# Patient Record
Sex: FEMALE
[De-identification: names, ages, dates, MRNs, and addresses within clinical notes are randomized per-mention and may not be internally consistent; named-entity substitution may affect disease eponyms.]

---

## 2018-06-20 ENCOUNTER — HOSPITAL ENCOUNTER (EMERGENCY)
Dept: HOSPITAL 5 - ED | Age: 68
LOS: 1 days | Discharge: HOME | End: 2018-06-21
Payer: MEDICARE

## 2018-06-20 DIAGNOSIS — Z90.49: ICD-10-CM

## 2018-06-20 DIAGNOSIS — S80.02XA: Primary | ICD-10-CM

## 2018-06-20 DIAGNOSIS — J45.909: ICD-10-CM

## 2018-06-20 DIAGNOSIS — I10: ICD-10-CM

## 2018-06-20 DIAGNOSIS — Z91.018: ICD-10-CM

## 2018-06-20 DIAGNOSIS — Z79.82: ICD-10-CM

## 2018-06-20 DIAGNOSIS — E11.9: ICD-10-CM

## 2018-06-20 DIAGNOSIS — Z79.4: ICD-10-CM
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
## 2018-06-20 DIAGNOSIS — Z88.2: ICD-10-CM

## 2018-06-20 PROCEDURE — 72170 X-RAY EXAM OF PELVIS: CPT

## 2018-06-20 PROCEDURE — 90715 TDAP VACCINE 7 YRS/> IM: CPT

## 2018-06-20 PROCEDURE — 70486 CT MAXILLOFACIAL W/O DYE: CPT

## 2018-06-20 PROCEDURE — 90471 IMMUNIZATION ADMIN: CPT

## 2018-06-20 PROCEDURE — 70450 CT HEAD/BRAIN W/O DYE: CPT

## 2018-06-20 PROCEDURE — A6250 SKIN SEAL PROTECT MOISTURIZR: HCPCS

## 2018-06-20 NOTE — XRAY REPORT
FINAL REPORT



PROCEDURE:  XR PELVIS 1-2V



TECHNIQUE:  Pelvis radiograph, AP view. CPT 35432







HISTORY:  fall leg pain 



COMPARISON:  No prior studies are available for comparison.



FINDINGS:  

Fracture(s): None .



Joint spaces: Normal .



Soft tissues: Normal .



Foreign bodies: None .



Bone mineralization: Normal .



A subcutaneous abdominal port with catheter is identified.



IMPRESSION:  

Unremarkable pelvis.

## 2018-06-20 NOTE — CAT SCAN REPORT
FINAL REPORT



PROCEDURE:  CT HEAD/BRAIN WO CON



TECHNIQUE:  Computerized tomography of the head was performed

without contrast material. 



HISTORY:  fall facial traum,a 



COMPARISON:  No prior studies are available for comparison.



FINDINGS:  

Skull and scalp: Normal.



Paranasal sinuses: Normal.



Ventricles and subarachnoid spaces: Are prominent consistent with

cerebral atrophy appropriate for patient's age..



Cerebrum: There is mild degree bilateral periventricular

nonspecific white matter hypodensity most likely representing

chronic microangiopathy. An old lacunar infarct is noted

involving the anterior limb right internal capsule. An acute

extra-axial or intra-axial hemorrhage is not identified. There is

no mass effect..



Cerebellum and brainstem: No evidence of hemorrhage, acute

infarction or mass.



Vasculature: Normal.



Comments: None.



IMPRESSION:  

No acute intracranial abnormality.

## 2018-06-20 NOTE — CAT SCAN REPORT
FINAL REPORT



PROCEDURE:  CT FACIAL BONES WO CON



TECHNIQUE:  Computerized tomography of the facial bones and soft

tissues with axial and coronal sections performed from the

cranial aspect of the frontal sinuses to the caudal portion of

the mandible without contrast material. 



HISTORY:  fall facial traum,a 



COMPARISON:  No prior studies are available for comparison.



FINDINGS:  

Bones: No significant abnormality.



Paranasal sinuses: Clear.



Soft tissues: No significant abnormality.



Other: Atherosclerotic calcification is noted involving bilateral

carotid bifurcations..



IMPRESSION:  

No acute fracture



Atherosclerotic calcification of bilateral carotid bifurcations.

Doppler evaluation is recommended.

## 2018-06-20 NOTE — EMERGENCY DEPARTMENT REPORT
ED Fall HPI





- General


Chief Complaint: Fall


Stated Complaint: KNEE PAIN


Time Seen by Provider: 06/20/18 21:19


Source: patient, RN notes reviewed


Mode of arrival: Stretcher


Limitations: Physical Limitation





- History of Present Illness


Initial Comments: 











Primary care Dr.: Aruna phan





Orthopedics: Adeline Francis is a 68-year-old female, unknown to this provider, takes aspirin, presents 

to the ER after mechanical trip and fall, landing on her left knee, and left 

side of her face.  There were no symptoms prior to the fall, she tripped while 

walking her dog.  Her left knee pain is sharp and achy, increases with palpation

, decreases with rest, does not radiate anywhere.


She can't recall her last tetanus vaccination, and has left-sided forehead pain

, and left cheek pain where she fell.  There is no midline neck pain, there is 

no weakness, numbness, ataxia, chest pain, shortness of breath, abdominal pain, 

and there is no bladder or bowel retention or incontinence.











MD Complaint: fall


-: Sudden


Fall From: standing


When Fall Occurred: 1 hour PTA


Fall Witnessed: no


Place Fall Occurred: home


Loss of Consciousness: none


Prolonged Down Time?: no


Symptoms Prior to Fall: none


Location: head, other


Location - Extremities: Left: Knee, Leg


Severity: moderate


Quality: other (see history of present illness)


Associated Symptoms: denies: neck pain, numbness, weakness, chest paint, 

shortness of breath, abdominal pain, hematuria, lightheaded, vertigo, confusion





- Related Data


 Home Medications











 Medication  Instructions  Recorded  Confirmed  Last Taken


 


Aspirin [Aspirin BABY CHEW TAB] 81 mg PO QDAY 11/26/13 08/27/14 08/13/14





    81 MG


 


Atorvastatin (Nf) [Lipitor (Nf)] 20 mg PO QDAY 11/26/13 08/27/14 08/26/14 22:00





    20 MG


 


Celecoxib [celeBREX] 200 mg PO DAILY 11/26/13 08/27/14 08/26/14 22:00





    200 MG


 


Insulin Detemir [Levemir Flexpen] 100 unit SQ HS 11/26/13 08/27/14 08/25/14 23:

30





    54 UNITS


 


Valsartan [Diovan] 160 mg PO QDAY 11/26/13 08/27/14 08/27/14 06:00





    160 MG


 


Allopurinol [Zyloprim] 300 mg PO QDAY 08/05/14 08/27/14 08/26/14 08:30





    300 MG


 


Escitalopram [Lexapro] 10 mg PO DAILY 08/05/14 08/27/14 08/26/14 20:00





    10 MG


 


Glipizide/Metformin HCl 1 each PO BID 08/05/14 08/27/14 08/26/14 22:00





[glipiZIDE-Metformin 5-500 mg]    1 TAB


 


Sitagliptin Phosphate [Januvia] 100 mg PO DAILY 08/05/14 08/27/14 08/26/14 09:00





    100 mg


 


Levalbuterol HCl [Xopenex] 3 ml INHALATION PRN PRN 08/27/14 08/27/14 08/27/14 06

:00





    3 ML








 Previous Rx's











 Medication  Instructions  Recorded  Last Taken  Type


 


HYDROcodone/ACETAMINOPHEN [Norco 1 each PO 6XD PRN #60 tablet 09/03/14 Unknown 

Rx





5/325 Tablet]    


 


Acetaminophen [Tylenol Arthritis] 650 mg PO Q6HR PRN #30 tablet.er 06/20/18 

Unknown Rx


 


Bacitracin Zinc Oint [Antibiotic 20 applic TP BID #1 tube 06/20/18 Unknown Rx





Oint]    











 Allergies











Allergy/AdvReac Type Severity Reaction Status Date / Time


 


Sulfa (Sulfonamide Allergy  Swelling Verified 12/12/13 07:56





Antibiotics)     


 


corn [Corn] AdvReac  Unknown Verified 08/05/14 12:45


 


strawberry [Strawberry] AdvReac  Unknown Verified 08/05/14 12:45














ED Review of Systems


ROS: 


Stated complaint: KNEE PAIN


Other details as noted in HPI





Comment: All other systems reviewed and negative





ED Past Medical Hx





- Past Medical History


Hx Hypertension: Yes (2001/PCP-domo [facility])


Hx Heart Attack/AMI: No


Hx Diabetes: Yes (1994)


Hx Renal Disease: No


Hx Headaches / Migraines: No


Hx Seizures: No


Hx Asthma: Yes


Hx Dementia: No





- Surgical History


Hx Cholecystectomy: Yes (1994)


Hx Appendectomy: Yes (1965)


Additional Surgical History: R KNEE





- Social History


Smoking Status: Never Smoker


Substance Use Type: None





- Medications


Home Medications: 


 Home Medications











 Medication  Instructions  Recorded  Confirmed  Last Taken  Type


 


Aspirin [Aspirin BABY CHEW TAB] 81 mg PO QDAY 11/26/13 08/27/14 08/13/14 History





    81 MG 


 


Atorvastatin (Nf) [Lipitor (Nf)] 20 mg PO QDAY 11/26/13 08/27/14 08/26/14 22:00 

History





    20 MG 


 


Celecoxib [celeBREX] 200 mg PO DAILY 11/26/13 08/27/14 08/26/14 22:00 History





    200 MG 


 


Insulin Detemir [Levemir Flexpen] 100 unit SQ HS 11/26/13 08/27/14 08/25/14 23:

30 History





    54 UNITS 


 


Valsartan [Diovan] 160 mg PO QDAY 11/26/13 08/27/14 08/27/14 06:00 History





    160 MG 


 


Allopurinol [Zyloprim] 300 mg PO QDAY 08/05/14 08/27/14 08/26/14 08:30 History





    300 MG 


 


Escitalopram [Lexapro] 10 mg PO DAILY 08/05/14 08/27/14 08/26/14 20:00 History





    10 MG 


 


Glipizide/Metformin HCl 1 each PO BID 08/05/14 08/27/14 08/26/14 22:00 History





[glipiZIDE-Metformin 5-500 mg]    1 TAB 


 


Sitagliptin Phosphate [Januvia] 100 mg PO DAILY 08/05/14 08/27/14 08/26/14 09:

00 History





    100 mg 


 


Levalbuterol HCl [Xopenex] 3 ml INHALATION PRN PRN 08/27/14 08/27/14 08/27/14 06

:00 History





    3 ML 


 


HYDROcodone/ACETAMINOPHEN [Norco 1 each PO 6XD PRN #60 tablet 09/03/14  Unknown 

Rx





5/325 Tablet]     


 


Acetaminophen [Tylenol Arthritis] 650 mg PO Q6HR PRN #30 tablet.er 06/20/18  

Unknown Rx


 


Bacitracin Zinc Oint [Antibiotic 20 applic TP BID #1 tube 06/20/18  Unknown Rx





Oint]     














ED Physical Exam





- General


Limitations: Physical Limitation


General appearance: alert, in no apparent distress





- Head


Head exam: Present: normocephalic, other (superficial forehead abrasions noted)





- Eye


Eye exam: Present: normal appearance, EOMI, other (visual acuity intact to 

finger counting, color perception, reading at a close distance).  Absent: 

nystagmus





- ENT


ENT exam: Present: normal exam, normal orophraynx, mucous membranes moist, TM's 

normal bilaterally (negative nasal septal hematoma.  No hemotympanum.), normal 

external ear exam, other (no dental laxity)





- Neck


Neck exam: Present: normal inspection, full ROM.  Absent: tenderness, 

meningismus





- Respiratory


Respiratory exam: Present: normal lung sounds bilaterally.  Absent: respiratory 

distress





- Cardiovascular


Cardiovascular Exam: Present: regular rate, normal rhythm, normal heart sounds.

  Absent: bradycardia, tachycardia, irregular rhythm, systolic murmur, 

diastolic murmur, rubs, gallop





- GI/Abdominal


GI/Abdominal exam: Present: soft, normal bowel sounds.  Absent: distended, 

tenderness, guarding, rebound, rigid, pulsatile mass





- Extremities Exam


Extremities exam: Present: full ROM, tenderness, normal capillary refill, other 

(2+ pulses noted in the bilateral upper, lower extremities.  The pelvis is 

stable.  There is no long bony tenderness.  Left knee is tender and swollen.  

There is no laxity.  There is full active, passive range of motion of the 

bilateral upper extremities, right lower extremity.  There is no distal lower 

extremity tenderness on the left, with the exception of the left knee 

tenderness.).  Absent: pedal edema, joint swelling, calf tenderness





- Back Exam


Back exam: Present: normal inspection, full ROM.  Absent: paraspinal tenderness

, vertebral tenderness





- Neurological Exam


Neurological exam: Present: alert, oriented X3, CN II-XII intact, other (

Extraocular movements intact.  Tongue midline.  No facial droop.  Facial 

sensation intact to light touch in the V1, V2, V3 distribution bilaterally.  5 

and 5 strength in 4 extremities..  Sensation is intact to light touch in 4 

extremities.).  Absent: motor sensory deficit





- Psychiatric


Psychiatric exam: Present: normal affect, normal mood





- Skin


Skin exam: Present: warm, abrasion, ecchymosis





ED Course


 Vital Signs











  06/20/18 06/20/18 06/20/18





  21:12 21:16 21:21


 


Temperature   98 F


 


Pulse Rate  86 91 H


 


Respiratory  25 H 18





Rate   


 


Blood Pressure  142/68 142/68


 


O2 Sat by Pulse 97 95 97





Oximetry   














ED Medical Decision Making





- Lab Data








 Vital Signs











  06/20/18 06/20/18 06/20/18





  21:12 21:16 21:21


 


Temperature   98 F


 


Pulse Rate  86 91 H


 


Respiratory  25 H 18





Rate   


 


Blood Pressure  142/68 142/68


 


O2 Sat by Pulse 97 95 97





Oximetry   














  06/20/18





  22:50


 


Temperature 


 


Pulse Rate 


 


Respiratory 16





Rate 


 


Blood Pressure 


 


O2 Sat by Pulse 





Oximetry 














- Radiology Data


Radiology results: report reviewed, image reviewed


interpreted by me: 





X-ray of the pelvis/left knee/left femur is negative for acute disease





Soft tissue swelling is noted, no obvious fracture or dislocation











Noncontrast CT scan of the brain, facial bones negative for acute disease





- Medical Decision Making





Differential diagnosis, including but not limited to: Abrasion, fracture, 

facial injury, soft tissue injury











Assessment and plan: 68-year-old female status post mechanical trip and fall.

Patient is clinically sober at this time.  The cervical spine is cleared 

through nexus and [place] c spine rule





Noncontrast CT scan of the brain and facial bones were negative.  X-ray of the 

pelvis, left knee, left femur were negative.  Her physical exam is otherwise 

unremarkable.  The patient is clinically sober with a GCS of 15, with an NIH 

score of 0.  She is given a tetanus vaccination and pain medication.  Expectant 

management and return precautions were reviewed with the patient and her 

family.  Family feels okay to take the patient home and observe her.  The 

patient also feels okay with this plan.





Critical care attestation.: 


If time is entered above; I have spent that time in minutes in the direct care 

of this critically ill patient, excluding procedure time.








ED Disposition


Clinical Impression: 


 Left knee pain, Facial contusion, Fall





Disposition: DC-01 TO HOME OR SELFCARE


Is pt being admited?: No


Does the pt Need Aspirin: No


Condition: Stable


Instructions:  Arthralgia (ED)


Additional Instructions: 


Pain typically gets worse before it gets better after a mechanical fall.  Rest, 

and avoid heavy lifting.  Avoid strenuous physical activity.  X-ray of the 

pelvis, left femur, left knee was initially interpreted by the ER physician as 

having no acute disease, a formal radiology interpretation will result in the 

next 24 hours.  Have your primary care doctor or orthopedist contact the 

medical records department to obtain the final radiology interpretation.  Follow

-up with the primary care doctor or orthopedist within the next 7-10 days.  

Return to the ER right away with new pain, worsened pain, migration of pain, 

projectile vomiting, change in mental status, inability to tolerate liquid 

feedings.  Use the bacitracin ointment as directed, and wash face and abrasions 

with gentle soap and water every 8-12 hours.


Referrals: 


PRIMARY CARE,MD [Primary Care Provider] - 3-5 Days


KALE CARLSON MD [Staff Physician] - 3-5 Days


XOCHILT VAN MD [Staff Physician] - 3-5 Days

## 2018-06-21 VITALS — SYSTOLIC BLOOD PRESSURE: 149 MMHG | DIASTOLIC BLOOD PRESSURE: 66 MMHG

## 2018-06-21 NOTE — XRAY REPORT
FINAL REPORT



PROCEDURE:  XR KNEE 3V LT



TECHNIQUE:  LEFT knee radiographs, AP, lateral and sunrise views.

CPT 55435







HISTORY:  fall leg pain 



COMPARISON:  No prior studies are available for comparison.



FINDINGS:  

Fracture (s) and/or Dislocation(s): None .



Alignment: Normal .



Joint space(s): Normal .



Soft tissues: Normal .



Bone mineralization: Normal .



Foreign bodies: None .







IMPRESSION:  

Normal Examination.

## 2018-06-21 NOTE — XRAY REPORT
FINAL REPORT



PROCEDURE:  XR FEMUR 2+V LT



TECHNIQUE:  LEFT femur radiographs, AP and lateral views. 



HISTORY:  fall leg pain 



COMPARISON:  No prior studies are available for comparison.



FINDINGS:  

Fracture (s) and/or Dislocation(s): None .



Joint space(s): Normal .



Soft tissues: Normal .



Bone mineralization: Normal .



Foreign bodies: None .







IMPRESSION:  

Normal Examination

## 2018-07-23 ENCOUNTER — HOSPITAL ENCOUNTER (OUTPATIENT)
Dept: HOSPITAL 5 - LAB | Age: 68
Discharge: HOME | End: 2018-07-23
Attending: ORTHOPAEDIC SURGERY
Payer: MEDICARE

## 2018-07-23 DIAGNOSIS — J44.9: ICD-10-CM

## 2018-07-23 DIAGNOSIS — M70.42: Primary | ICD-10-CM

## 2018-07-23 DIAGNOSIS — E78.00: ICD-10-CM

## 2018-07-23 DIAGNOSIS — I10: ICD-10-CM

## 2018-07-23 DIAGNOSIS — Z88.1: ICD-10-CM

## 2018-07-23 DIAGNOSIS — Z91.018: ICD-10-CM

## 2018-07-23 DIAGNOSIS — F32.9: ICD-10-CM

## 2018-07-23 DIAGNOSIS — E11.59: ICD-10-CM

## 2018-07-23 DIAGNOSIS — M19.90: ICD-10-CM

## 2018-07-23 LAB
MONOCYTES # FLD: 11 %
TOTAL CELLS COUNTED: 100 /MM3

## 2018-07-23 PROCEDURE — 87116 MYCOBACTERIA CULTURE: CPT

## 2018-07-23 PROCEDURE — 89051 BODY FLUID CELL COUNT: CPT

## 2019-07-08 ENCOUNTER — HOSPITAL ENCOUNTER (OUTPATIENT)
Dept: HOSPITAL 5 - LAB | Age: 69
Discharge: HOME | End: 2019-07-08
Attending: INTERNAL MEDICINE
Payer: MEDICARE

## 2019-07-08 DIAGNOSIS — E78.5: Primary | ICD-10-CM

## 2019-07-08 DIAGNOSIS — N39.0: ICD-10-CM

## 2019-07-08 DIAGNOSIS — I10: ICD-10-CM

## 2019-07-08 DIAGNOSIS — E78.00: ICD-10-CM

## 2019-07-08 DIAGNOSIS — J44.9: ICD-10-CM

## 2019-07-08 DIAGNOSIS — K21.9: ICD-10-CM

## 2019-07-08 DIAGNOSIS — Z90.89: ICD-10-CM

## 2019-07-08 LAB
BACTERIA #/AREA URNS HPF: (no result) /HPF
BILIRUB UR QL STRIP: (no result)
BLOOD UR QL VISUAL: (no result)
BUN SERPL-MCNC: 24 MG/DL (ref 7–17)
BUN/CREAT SERPL: 17 %
CALCIUM SERPL-MCNC: 9.7 MG/DL (ref 8.4–10.2)
HEMOLYSIS INDEX: 35
MUCOUS THREADS #/AREA URNS HPF: (no result) /HPF
PH UR STRIP: 5 [PH] (ref 5–7)
PROT UR STRIP-MCNC: (no result) MG/DL
RBC #/AREA URNS HPF: 2 /HPF (ref 0–6)
UROBILINOGEN UR-MCNC: < 2 MG/DL (ref ?–2)
WBC #/AREA URNS HPF: 5 /HPF (ref 0–6)

## 2019-07-08 PROCEDURE — 80048 BASIC METABOLIC PNL TOTAL CA: CPT

## 2019-07-08 PROCEDURE — 81001 URINALYSIS AUTO W/SCOPE: CPT

## 2019-07-08 PROCEDURE — 36415 COLL VENOUS BLD VENIPUNCTURE: CPT

## 2019-08-12 ENCOUNTER — HOSPITAL ENCOUNTER (OUTPATIENT)
Dept: HOSPITAL 5 - VAS | Age: 69
Discharge: HOME | End: 2019-08-12
Attending: INTERNAL MEDICINE
Payer: MEDICARE

## 2019-08-12 DIAGNOSIS — K21.9: ICD-10-CM

## 2019-08-12 DIAGNOSIS — E11.9: ICD-10-CM

## 2019-08-12 DIAGNOSIS — E78.00: ICD-10-CM

## 2019-08-12 DIAGNOSIS — I10: ICD-10-CM

## 2019-08-12 DIAGNOSIS — I73.9: Primary | ICD-10-CM

## 2019-08-12 DIAGNOSIS — J44.9: ICD-10-CM

## 2019-08-12 PROCEDURE — 93925 LOWER EXTREMITY STUDY: CPT

## 2019-08-12 NOTE — VASCULAR LAB REPORT
DUPLEX DOPPLER LOWER EXTREMITY ARTERIAL, BILATERAL



INDICATION: 

I73.9(PERIPHERAL VASCULAR DISEASE UNSPECIFIED).



TECHNIQUE:

Arterial duplex examination of both lower extremities performed using B-mode, color flow and spectral
 Doppler assessment.



FINDINGS: 



RIGHT:

Common Femoral Artery:  cm/sec.  Triphasic waveform.

Proximal SFA:  cm/sec.  Triphasic waveform.

Mid SFA:  cm/sec.  Triphasic waveform.

Distal SFA: PSV 82 cm/sec.  Triphasic waveform.

Popliteal artery: PSV 88 cm/sec.  Triphasic waveform.

Posterior tibial artery: PSV 85 cm/sec.  Triphasic waveform.

Anterior tibial artery: PSV 84 cm/sec.  Triphasic waveform.

Dorsalis pedis artery: 24 cm/s. Biphasic waveform.



LEFT:

Common Femoral Artery:  cm/sec.  Triphasic waveform.

Proximal SFA:  cm/sec.  Triphasic waveform.

Mid SFA:  cm/sec.  Triphasic waveform.

Distal SFA: PSV 80 cm/sec.  Triphasic waveform.

Popliteal artery: PSV 88 cm/sec.  Triphasic waveform.

Posterior tibial artery: PSV 90 cm/sec.  Triphasic waveform.

Anterior tibial artery: 100 cm/s. Triphasic waveform.

Dorsalis Pedis Artery: PSV 48 cm/sec.  Biphasic waveform.





IMPRESSION:

No significant lower extremity peripheral artery disease. 





Doppler Waveform: 

*  Triphasic is normal. 

*  Biphasic is abnormal if clear transition from triphasic signal along vascular tree.

*  Monophasic is abnormal.







Signer Name: Moi Rogers Jr, MD 

Signed: 8/12/2019 12:34 PM

 Workstation Name: [id number]

## 2019-10-23 ENCOUNTER — HOSPITAL ENCOUNTER (OUTPATIENT)
Dept: HOSPITAL 5 - LAB | Age: 69
Discharge: HOME | End: 2019-10-23
Attending: INTERNAL MEDICINE
Payer: MEDICARE

## 2019-10-23 DIAGNOSIS — J44.9: ICD-10-CM

## 2019-10-23 DIAGNOSIS — E78.5: ICD-10-CM

## 2019-10-23 DIAGNOSIS — Z72.89: ICD-10-CM

## 2019-10-23 DIAGNOSIS — Z90.49: ICD-10-CM

## 2019-10-23 DIAGNOSIS — E78.00: ICD-10-CM

## 2019-10-23 DIAGNOSIS — I10: ICD-10-CM

## 2019-10-23 DIAGNOSIS — E11.9: Primary | ICD-10-CM

## 2019-10-23 DIAGNOSIS — E87.5: ICD-10-CM

## 2019-10-23 LAB
BUN SERPL-MCNC: 20 MG/DL (ref 7–17)
BUN/CREAT SERPL: 17 %
CALCIUM SERPL-MCNC: 9.3 MG/DL (ref 8.4–10.2)
HDLC SERPL-MCNC: 32 MG/DL (ref 40–59)
HEMOLYSIS INDEX: 9

## 2019-10-23 PROCEDURE — 83036 HEMOGLOBIN GLYCOSYLATED A1C: CPT

## 2019-10-23 PROCEDURE — 80048 BASIC METABOLIC PNL TOTAL CA: CPT

## 2019-10-23 PROCEDURE — 36415 COLL VENOUS BLD VENIPUNCTURE: CPT

## 2019-10-23 PROCEDURE — 80061 LIPID PANEL: CPT

## 2021-01-18 ENCOUNTER — HOSPITAL ENCOUNTER (EMERGENCY)
Dept: HOSPITAL 5 - ED | Age: 71
LOS: 1 days | Discharge: HOME | End: 2021-01-19
Payer: MEDICARE

## 2021-01-18 DIAGNOSIS — Z98.890: ICD-10-CM

## 2021-01-18 DIAGNOSIS — Z90.49: ICD-10-CM

## 2021-01-18 DIAGNOSIS — K59.00: ICD-10-CM

## 2021-01-18 DIAGNOSIS — N39.0: Primary | ICD-10-CM

## 2021-01-18 DIAGNOSIS — Z79.899: ICD-10-CM

## 2021-01-18 DIAGNOSIS — I10: ICD-10-CM

## 2021-01-18 DIAGNOSIS — Z88.2: ICD-10-CM

## 2021-01-18 DIAGNOSIS — Z91.018: ICD-10-CM

## 2021-01-18 DIAGNOSIS — R10.84: ICD-10-CM

## 2021-01-18 LAB
ALBUMIN SERPL-MCNC: 4.4 G/DL (ref 3.9–5)
ALT SERPL-CCNC: 21 UNITS/L (ref 7–56)
BASOPHILS # (AUTO): 0 K/MM3 (ref 0–0.1)
BASOPHILS NFR BLD AUTO: 0.4 % (ref 0–1.8)
BUN SERPL-MCNC: 29 MG/DL (ref 7–17)
BUN/CREAT SERPL: 18 %
CALCIUM SERPL-MCNC: 9.3 MG/DL (ref 8.4–10.2)
EOSINOPHIL # BLD AUTO: 0.1 K/MM3 (ref 0–0.4)
EOSINOPHIL NFR BLD AUTO: 0.5 % (ref 0–4.3)
HCT VFR BLD CALC: 41.4 % (ref 30.3–42.9)
HEMOLYSIS INDEX: 16
HGB BLD-MCNC: 13.4 GM/DL (ref 10.1–14.3)
LYMPHOCYTES # BLD AUTO: 1.1 K/MM3 (ref 1.2–5.4)
LYMPHOCYTES NFR BLD AUTO: 9.9 % (ref 13.4–35)
MCHC RBC AUTO-ENTMCNC: 33 % (ref 30–34)
MCV RBC AUTO: 83 FL (ref 79–97)
MONOCYTES # (AUTO): 0.4 K/MM3 (ref 0–0.8)
MONOCYTES % (AUTO): 3.5 % (ref 0–7.3)
PLATELET # BLD: 282 K/MM3 (ref 140–440)
RBC # BLD AUTO: 4.96 M/MM3 (ref 3.65–5.03)

## 2021-01-18 PROCEDURE — 74022 RADEX COMPL AQT ABD SERIES: CPT

## 2021-01-18 PROCEDURE — 87086 URINE CULTURE/COLONY COUNT: CPT

## 2021-01-18 PROCEDURE — 83690 ASSAY OF LIPASE: CPT

## 2021-01-18 PROCEDURE — 36415 COLL VENOUS BLD VENIPUNCTURE: CPT

## 2021-01-18 PROCEDURE — 74176 CT ABD & PELVIS W/O CONTRAST: CPT

## 2021-01-18 PROCEDURE — 99284 EMERGENCY DEPT VISIT MOD MDM: CPT

## 2021-01-18 PROCEDURE — 85025 COMPLETE CBC W/AUTO DIFF WBC: CPT

## 2021-01-18 PROCEDURE — 81001 URINALYSIS AUTO W/SCOPE: CPT

## 2021-01-18 PROCEDURE — 80053 COMPREHEN METABOLIC PANEL: CPT

## 2021-01-18 PROCEDURE — 96372 THER/PROPH/DIAG INJ SC/IM: CPT

## 2021-01-18 NOTE — EMERGENCY DEPARTMENT REPORT
Blank Doc





- Documentation


Documentation: 





70-year-old female that presents with abdominal pain with nausea.  Stated dagmar arias is constipated.





1- This initial assessment/diagnostic orders/clinical plan/ treatment(s) is/are 

subject to change based on pt's health status, clinical progression and re-

assessment by fellow clinical providers in the ED. Further treatment and workup 

at subsequent clinical provers discretion. Patient/guardians urged not to elope 

from ED as their condition may be serious if not clinically assessed and 

managed. 


2-labs


3-UA

## 2021-01-19 VITALS — DIASTOLIC BLOOD PRESSURE: 64 MMHG | SYSTOLIC BLOOD PRESSURE: 145 MMHG

## 2021-01-19 LAB
BACTERIA #/AREA URNS HPF: (no result) /HPF
BILIRUB UR QL STRIP: (no result)
BLOOD UR QL VISUAL: (no result)
HYALINE CASTS #/AREA URNS LPF: 6 /LPF
MUCOUS THREADS #/AREA URNS HPF: (no result) /HPF
PH UR STRIP: 5 [PH] (ref 5–7)
RBC #/AREA URNS HPF: 6 /HPF (ref 0–6)
UROBILINOGEN UR-MCNC: < 2 MG/DL (ref ?–2)
WBC #/AREA URNS HPF: 79 /HPF (ref 0–6)

## 2021-01-19 NOTE — EMERGENCY DEPARTMENT REPORT
ED Abdominal Pain HPI





- General


Chief Complaint: Abdominal Pain


Stated Complaint: CONSTIPATION/ABD PAIN


Time Seen by Provider: 01/18/21 22:26


Source: patient


Mode of arrival: Ambulatory


Limitations: No Limitations





- History of Present Illness


Initial Comments: 





Patient is 70 years old female with history of hypertension, diabetes and 

asthma.  Patient presented to the ER complaining of 2-day history of diffuse 

abdominal pain, crampy in nature with no radiation.  Patient stated that she has

been dry heaving but no vomiting.  Patient stated that she did not have a bowel 

movement for the last 2 days and she feels like she is very constipated.  

Patient denied any fever or chills.  No chest pain or shortness of breath.


MD Complaint: abdominal pain


-: Last night


Location: diffuse


Radiation: none


Severity scale (0 -10): 4


Quality: cramping





- Related Data


                                Home Medications











 Medication  Instructions  Recorded  Confirmed  Last Taken


 


Aspirin [Aspirin BABY CHEW TAB] 81 mg PO QDAY 11/26/13 08/27/14 08/13/14





    81 MG


 


Atorvastatin (Nf) [Lipitor (Nf)] 20 mg PO QDAY 11/26/13 08/27/14 08/26/14 22:00





    20 MG


 


Celecoxib [celeBREX] 200 mg PO DAILY 11/26/13 08/27/14 08/26/14 22:00





    200 MG


 


Insulin Detemir [Levemir Flexpen] 100 unit SQ HS 11/26/13 08/27/14 08/25/14 

23:30





    54 UNITS


 


Valsartan [Diovan] 160 mg PO QDAY 11/26/13 08/27/14 08/27/14 06:00





    160 MG


 


Escitalopram [Lexapro] 10 mg PO DAILY 08/05/14 08/27/14 08/26/14 20:00





    10 MG


 


Glipizide/Metformin HCl 1 each PO BID 08/05/14 08/27/14 08/26/14 22:00





[glipiZIDE-Metformin 5-500 mg]    1 TAB


 


Sitagliptin Phosphate [Januvia] 100 mg PO DAILY 08/05/14 08/27/14 08/26/14 09:00





    100 mg


 


allopurinoL [Zyloprim] 300 mg PO QDAY 08/05/14 08/27/14 08/26/14 08:30





    300 MG


 


Levalbuterol HCl [Xopenex] 3 ml INHALATION PRN PRN 08/27/14 08/27/14 08/27/14 

06:00





    3 ML








                                  Previous Rx's











 Medication  Instructions  Recorded  Last Taken  Type


 


HYDROcodone/ACETAMINOPHEN [Norco 1 each PO 6XD PRN #60 tablet 09/03/14 Unknown 

Rx





5/325 Tablet]    


 


Acetaminophen [Tylenol Arthritis] 650 mg PO Q6HR PRN #30 tablet.er 06/20/18 

Unknown Rx


 


Bacitracin Zinc Oint [Antibiotic 20 applic TP BID #1 tube 06/20/18 Unknown Rx





Oint]    


 


Docusate Sodium [Colace] 100 mg PO BID PRN #60 capsule 01/19/21 Unknown Rx


 


Lactulose 10 gm PO DAILY PRN #150 ml 01/19/21 Unknown Rx











                                    Allergies











Allergy/AdvReac Type Severity Reaction Status Date / Time


 


Sulfa (Sulfonamide Allergy  Swelling Verified 12/12/13 07:56





Antibiotics)     


 


corn [Corn] AdvReac  Unknown Verified 08/05/14 12:45


 


strawberry [Strawberry] AdvReac  Unknown Verified 08/05/14 12:45














ED Review of Systems


ROS: 


Stated complaint: CONSTIPATION/ABD PAIN


Other details as noted in HPI





Comment: All other systems reviewed and negative


Constitutional: denies: chills, fever


Respiratory: denies: cough, orthopnea, shortness of breath, SOB with exertion


Cardiovascular: denies: chest pain, palpitations


Gastrointestinal: abdominal pain, nausea, constipation.  denies: vomiting, 

diarrhea, hematemesis, melena, hematochezia


Musculoskeletal: denies: back pain





ED Past Medical Hx





- Past Medical History


Previous Medical History?: Yes


Hx Hypertension: Yes (2001/PCP-domo [facility])


Hx Heart Attack/AMI: No


Hx Diabetes: Yes (1994)


Hx Renal Disease: No


Hx Headaches / Migraines: No


Hx Seizures: No


Hx Asthma: Yes


Hx Dementia: No


Additional medical history: bell's palsey





- Surgical History


Past Surgical History?: Yes


Hx Cholecystectomy: Yes (1994)


Hx Appendectomy: Yes (1965)


Additional Surgical History: R KNEE





- Social History


Smoking Status: Never Smoker


Substance Use Type: None





- Medications


Home Medications: 


                                Home Medications











 Medication  Instructions  Recorded  Confirmed  Last Taken  Type


 


Aspirin [Aspirin BABY CHEW TAB] 81 mg PO QDAY 11/26/13 08/27/14 08/13/14 History





    81 MG 


 


Atorvastatin (Nf) [Lipitor (Nf)] 20 mg PO QDAY 11/26/13 08/27/14 08/26/14 22:00 

History





    20 MG 


 


Celecoxib [celeBREX] 200 mg PO DAILY 11/26/13 08/27/14 08/26/14 22:00 History





    200 MG 


 


Insulin Detemir [Levemir Flexpen] 100 unit SQ HS 11/26/13 08/27/14 08/25/14 

23:30 History





    54 UNITS 


 


Valsartan [Diovan] 160 mg PO QDAY 11/26/13 08/27/14 08/27/14 06:00 History





    160 MG 


 


Escitalopram [Lexapro] 10 mg PO DAILY 08/05/14 08/27/14 08/26/14 20:00 History





    10 MG 


 


Glipizide/Metformin HCl 1 each PO BID 08/05/14 08/27/14 08/26/14 22:00 History





[glipiZIDE-Metformin 5-500 mg]    1 TAB 


 


Sitagliptin Phosphate [Januvia] 100 mg PO DAILY 08/05/14 08/27/14 08/26/14 09:00

 History





    100 mg 


 


allopurinoL [Zyloprim] 300 mg PO QDAY 08/05/14 08/27/14 08/26/14 08:30 History





    300 MG 


 


Levalbuterol HCl [Xopenex] 3 ml INHALATION PRN PRN 08/27/14 08/27/14 08/27/14 

06:00 History





    3 ML 


 


HYDROcodone/ACETAMINOPHEN [Norco 1 each PO 6XD PRN #60 tablet 09/03/14  Unknown 

Rx





5/325 Tablet]     


 


Acetaminophen [Tylenol Arthritis] 650 mg PO Q6HR PRN #30 tablet.er 06/20/18  

Unknown Rx


 


Bacitracin Zinc Oint [Antibiotic 20 applic TP BID #1 tube 06/20/18  Unknown Rx





Oint]     


 


Docusate Sodium [Colace] 100 mg PO BID PRN #60 capsule 01/19/21  Unknown Rx


 


Lactulose 10 gm PO DAILY PRN #150 ml 01/19/21  Unknown Rx














ED Physical Exam





- General


Limitations: No Limitations


General appearance: alert, in no apparent distress





- Head


Head exam: Present: atraumatic, normocephalic, normal inspection





- Eye


Eye exam: Present: normal appearance, PERRL





- ENT


ENT exam: Present: normal exam, normal orophraynx, mucous membranes moist





- Neck


Neck exam: Present: normal inspection, full ROM.  Absent: tenderness, 

meningismus





- Respiratory


Respiratory exam: Present: normal lung sounds bilaterally





- Cardiovascular


Cardiovascular Exam: Present: regular rate, normal rhythm, normal heart sounds





- GI/Abdominal


GI/Abdominal exam: Present: soft, normal bowel sounds.  Absent: distended, 

tenderness, guarding, rebound, rigid, organomegaly, mass, bruit, pulsatile mass,

 hernia





- Extremities Exam


Extremities exam: Present: normal inspection, full ROM, normal capillary refill.

  Absent: tenderness, pedal edema, calf tenderness





- Back Exam


Back exam: Present: normal inspection, full ROM.  Absent: CVA tenderness (R), 

CVA tenderness (L)





- Neurological Exam


Neurological exam: Present: alert, oriented X3, CN II-XII intact





- Psychiatric


Psychiatric exam: Present: normal mood





- Skin


Skin exam: Present: warm, intact, normal color





ED Course


                                   Vital Signs











  01/18/21 01/19/21





  22:31 02:27


 


Temperature 97.9 F 


 


Pulse Rate 101 H 


 


Respiratory 16 18





Rate  


 


Blood Pressure 175/82 


 


O2 Sat by Pulse 94 





Oximetry  














ED Medical Decision Making





- Lab Data


Result diagrams: 


                                 01/18/21 22:32





                                 01/18/21 22:32





- Radiology Data


Radiology results: report reviewed





- Medical Decision Making





Patient is 70 years old female with history of hypertension, diabetes and 

asthma.  Patient presented to the ER complaining of 2-day history of diffuse 

abdominal pain, crampy in nature with no radiation.  Patient stated that she has

 been dry heaving but no vomiting.  Patient stated that she did not have a bowel

 movement for the last 2 days and she feels like she is very constipated.  

Patient denied any fever or chills.  No chest pain or shortness of breath.





Labs reviewed and is unremarkable.  Patient received Zofran and stated that 

nausea is completely resolved.  CT abdomen and pelvis showed no evidence of 

obstruction however she has a impacted stool in the rectum area indicating 

constipation.  CT also not ruling and neoplastic changes in the rectal area.  P

atient informed about her CT finding and advised to strongly follow-up with her 

primary doctor for further management and referral to colorectal surgeon.  

Patient given prescription for Fleet enema and lactulose.


Critical care attestation.: 


If time is entered above; I have spent that time in minutes in the direct care 

of this critically ill patient, excluding procedure time.








ED Disposition


Clinical Impression: 


 Abdominal pain, Constipation, UTI (urinary tract infection)





Disposition: DC-01 TO HOME OR SELFCARE


Is pt being admited?: No


Condition: Stable


Instructions:  Abdominal Pain, Adult, Constipation, Adult, Abdominal Pain (ED), 

Urinary Tract Infection, Adult, Easy-to-Read


Prescriptions: 


Docusate Sodium [Colace] 100 mg PO BID PRN #60 capsule


 PRN Reason: Constipation


Lactulose 10 gm PO DAILY PRN #150 ml


 PRN Reason: Constipation


Referrals: 


SUKH GARY MD [Primary Care Provider] - 3-5 Days

## 2021-01-19 NOTE — CAT SCAN REPORT
CT ABDOMEN AND PELVIS WITHOUT CONTRAST



INDICATION: Patient complains of abdominal pain with nausea and constipation.



CONTRAST: Without IV



COMPARISON: None available.



All CT scans at this location are performed using CT dose reduction for ALARA by means of automated e
xposure control.



NOTE: Resolution is decreased and artifact is introduced by the patient's size.



FINDINGS: LAP-BAND is noted which appears to be in satisfactory position. Lung bases show mild probab
le chronic changes. No pneumoperitoneum is seen. No free fluid is noted. No significant abdominal wal
l herniation is seen. Gallbladder has been removed. No biliary dilatation is noted. Small right renal
 cyst is seen. Mild left renal atrophic changes are noted. No urinary tract calculi or evidence of ob
struction are seen. No other masses are noted. No evidence of bowel obstruction is seen. Appendix is 
not visualized. Mild colonic diverticulosis is seen without evidence of diverticulitis. Moderate amou
nt of stool is seen in the rectosigmoid colon which may include mild rectal impaction. The lower rect
al wall appears concentrically thickened. No abnormal fluid collections are seen. No lymphadenopathy 
is noted. No focal inflammatory changes are seen.





IMPRESSION: Evidence of distal constipation/rectal impaction. The lower rectal wall appears thickened
 concentrically. A neoplastic process is not excluded. Possibly this is edema related to proctitis th
ough I do not see significant surrounding inflammation. Clinical assessment is needed.





Signer Name: Calos Seals MD 

Signed: 1/19/2021 1:22 AM

Workstation Name: [id number]

## 2021-01-19 NOTE — XRAY REPORT
ACUTE ABDOMEN SERIES 3 VIEWS 8120



INDICATION: Decreased bowel movements, abdominal pain, abdominal distention, nausea, vomiting



COMPARISON: Pelvic radiograph 6/20/2018, chest x-ray 2/20/2009



FINDINGS: Lung fields appear clear. No pneumoperitoneum is noted. LAP-BAND is seen which appears to b
e in satisfactory position. Cholecystectomy changes are noted. No obvious urinary tract calculi are n
oted. The extreme lower pelvis is not fully included. Bowel gas pattern is unremarkable without evide
nce of obstruction.







Signer Name: Calos Seals MD 

Signed: 1/19/2021 12:26 AM

Workstation Name: [id number]

## 2021-06-09 ENCOUNTER — HOSPITAL ENCOUNTER (OUTPATIENT)
Dept: HOSPITAL 5 - LAB | Age: 71
Discharge: HOME | End: 2021-06-09
Attending: INTERNAL MEDICINE
Payer: MEDICARE

## 2021-06-09 DIAGNOSIS — D51.9: ICD-10-CM

## 2021-06-09 DIAGNOSIS — N39.0: ICD-10-CM

## 2021-06-09 DIAGNOSIS — Z13.29: Primary | ICD-10-CM

## 2021-06-09 DIAGNOSIS — E55.9: ICD-10-CM

## 2021-06-09 DIAGNOSIS — E11.22: ICD-10-CM

## 2021-06-09 DIAGNOSIS — N18.9: ICD-10-CM

## 2021-06-09 DIAGNOSIS — E78.5: ICD-10-CM

## 2021-06-09 LAB
ALBUMIN SERPL-MCNC: 4.2 G/DL (ref 3.9–5)
ALT SERPL-CCNC: 22 UNITS/L (ref 7–56)
BACTERIA #/AREA URNS HPF: (no result) /HPF
BASOPHILS # (AUTO): 0.1 K/MM3 (ref 0–0.1)
BASOPHILS NFR BLD AUTO: 0.8 % (ref 0–1.8)
BILIRUB UR QL STRIP: (no result)
BLOOD UR QL VISUAL: (no result)
BUN SERPL-MCNC: 26 MG/DL (ref 7–17)
BUN/CREAT SERPL: 19 %
CALCIUM SERPL-MCNC: 9.4 MG/DL (ref 8.4–10.2)
EOSINOPHIL # BLD AUTO: 0.2 K/MM3 (ref 0–0.4)
EOSINOPHIL NFR BLD AUTO: 3.3 % (ref 0–4.3)
HCT VFR BLD CALC: 40.3 % (ref 30.3–42.9)
HDLC SERPL-MCNC: 41 MG/DL (ref 40–59)
HEMOLYSIS INDEX: 8
HGB BLD-MCNC: 12.9 GM/DL (ref 10.1–14.3)
LYMPHOCYTES # BLD AUTO: 2.2 K/MM3 (ref 1.2–5.4)
LYMPHOCYTES NFR BLD AUTO: 34.7 % (ref 13.4–35)
MCHC RBC AUTO-ENTMCNC: 32 % (ref 30–34)
MCV RBC AUTO: 85 FL (ref 79–97)
MONOCYTES # (AUTO): 0.5 K/MM3 (ref 0–0.8)
MONOCYTES % (AUTO): 7.5 % (ref 0–7.3)
MUCOUS THREADS #/AREA URNS HPF: (no result) /HPF
PH UR STRIP: 5 [PH] (ref 5–7)
PLATELET # BLD: 297 K/MM3 (ref 140–440)
PROT UR STRIP-MCNC: (no result) MG/DL
RBC # BLD AUTO: 4.74 M/MM3 (ref 3.65–5.03)
RBC #/AREA URNS HPF: 3 /HPF (ref 0–6)
UROBILINOGEN UR-MCNC: < 2 MG/DL (ref ?–2)
WBC #/AREA URNS HPF: 51 /HPF (ref 0–6)

## 2021-06-09 PROCEDURE — 82306 VITAMIN D 25 HYDROXY: CPT

## 2021-06-09 PROCEDURE — 85025 COMPLETE CBC W/AUTO DIFF WBC: CPT

## 2021-06-09 PROCEDURE — 83036 HEMOGLOBIN GLYCOSYLATED A1C: CPT

## 2021-06-09 PROCEDURE — 82607 VITAMIN B-12: CPT

## 2021-06-09 PROCEDURE — 36415 COLL VENOUS BLD VENIPUNCTURE: CPT

## 2021-06-09 PROCEDURE — 81001 URINALYSIS AUTO W/SCOPE: CPT

## 2021-06-09 PROCEDURE — 80061 LIPID PANEL: CPT

## 2021-06-09 PROCEDURE — 87076 CULTURE ANAEROBE IDENT EACH: CPT

## 2021-06-09 PROCEDURE — 80053 COMPREHEN METABOLIC PANEL: CPT

## 2021-06-09 PROCEDURE — 84443 ASSAY THYROID STIM HORMONE: CPT

## 2021-06-09 PROCEDURE — 87086 URINE CULTURE/COLONY COUNT: CPT

## 2021-06-09 PROCEDURE — 87186 SC STD MICRODIL/AGAR DIL: CPT

## 2021-06-12 LAB — VITAMIN D2 SERPL-MCNC: <4 NG/ML

## 2021-10-21 ENCOUNTER — HOSPITAL ENCOUNTER (OUTPATIENT)
Dept: HOSPITAL 5 - LAB | Age: 71
Discharge: HOME | End: 2021-10-21
Attending: INTERNAL MEDICINE
Payer: MEDICARE

## 2021-10-21 DIAGNOSIS — E11.9: Primary | ICD-10-CM

## 2021-10-21 DIAGNOSIS — N18.9: ICD-10-CM

## 2021-10-21 DIAGNOSIS — R06.02: ICD-10-CM

## 2021-10-21 DIAGNOSIS — E78.5: ICD-10-CM

## 2021-10-21 LAB
BUN SERPL-MCNC: 25 MG/DL (ref 7–17)
BUN/CREAT SERPL: 15 %
CALCIUM SERPL-MCNC: 9.3 MG/DL (ref 8.4–10.2)
CREATININE,URINE: 142.9 MG/DL (ref 0.1–20)
HDLC SERPL-MCNC: 40 MG/DL (ref 40–59)
HEMOLYSIS INDEX: 11

## 2021-10-21 PROCEDURE — 71046 X-RAY EXAM CHEST 2 VIEWS: CPT

## 2021-10-21 PROCEDURE — 80048 BASIC METABOLIC PNL TOTAL CA: CPT

## 2021-10-21 PROCEDURE — 36415 COLL VENOUS BLD VENIPUNCTURE: CPT

## 2021-10-21 PROCEDURE — 80061 LIPID PANEL: CPT

## 2021-10-21 PROCEDURE — 82043 UR ALBUMIN QUANTITATIVE: CPT

## 2021-10-21 PROCEDURE — 83036 HEMOGLOBIN GLYCOSYLATED A1C: CPT

## 2021-10-21 NOTE — XRAY REPORT
CHEST 2 VIEWS 



INDICATION / CLINICAL INFORMATION:

SHORTNESS OF BREATH.



COMPARISON: 

1/18/2021



FINDINGS:



SUPPORT DEVICES: None.



HEART / MEDIASTINUM: No significant abnormality. 



LUNGS / PLEURA: No significant pulmonary or pleural abnormality. No pneumothorax. 



ADDITIONAL FINDINGS: No significant additional findings.



IMPRESSION:

1. No acute findings.



Signer Name: Manuel Prieto MD 

Signed: 10/21/2021 9:34 AM

Workstation Name: [id number]

## 2022-03-17 ENCOUNTER — HOSPITAL ENCOUNTER (OUTPATIENT)
Dept: HOSPITAL 5 - XRAY | Age: 72
Discharge: HOME | End: 2022-03-17
Attending: INTERNAL MEDICINE
Payer: MEDICARE

## 2022-03-17 DIAGNOSIS — M43.16: Primary | ICD-10-CM

## 2022-03-17 DIAGNOSIS — I70.0: ICD-10-CM

## 2022-03-17 DIAGNOSIS — M48.061: ICD-10-CM

## 2022-03-17 DIAGNOSIS — M25.78: ICD-10-CM

## 2022-03-17 PROCEDURE — 72100 X-RAY EXAM L-S SPINE 2/3 VWS: CPT

## 2022-03-17 NOTE — XRAY REPORT
LUMBAR SPINE 3 VIEWS



INDICATION:

M54.50 LOW BACK PAIN,UNSPECIFIED



COMPARISON:

None



FINDINGS:

There is no fracture, subluxation, or other acute radiographic abnormality of the lumbar spine. There
 is moderate to severe disc space narrowing at L5-3-4 with a grade 1 spondylolisthesis at L3-4. There
 is anterior osteophyte formation at L5 3 and L4 and to a lesser extent at L5-S1 and L4-5. There is m
ild spondylitic change throughout the lumbar spine as well.



There is spondylitic change in the lower thoracic spine with disc space narrowing and prominent anter
ior osteophyte formation at T9-10 and T10-11 and to lesser extent at T11-12.



Atherosclerotic calcifications are noted in the aorta and common iliac arteries.



Incidental note is made of a lap band device



Signer Name: Cameron Gutierrez MD 

Signed: 3/17/2022 9:31 AM

Workstation Name: [id number] 1 mo 10 do M with hx of anoxic brain injury and feeding difficulty.    -No history of reflux over the last week.  -Will make arrangements for gastrostomy tube placement.